# Patient Record
Sex: FEMALE | Race: WHITE | Employment: FULL TIME | URBAN - METROPOLITAN AREA
[De-identification: names, ages, dates, MRNs, and addresses within clinical notes are randomized per-mention and may not be internally consistent; named-entity substitution may affect disease eponyms.]

---

## 2023-02-03 NOTE — PATIENT INSTRUCTIONS
Spoke with patient and she was told to arrive at 0900  I told her she could arrive at 0930 at [facility] on 2/6/2023  for her  lumbar puncture at 1100. Patient will be npo after midnight and will call her  about taking her medications. Patient will arrive between 0900 and 0930.

## 2023-02-06 ENCOUNTER — HOSPITAL ENCOUNTER (OUTPATIENT)
Dept: GENERAL RADIOLOGY | Age: 28
Discharge: HOME | End: 2023-02-06
Payer: COMMERCIAL

## 2023-02-06 VITALS
BODY MASS INDEX: 20.09 KG/M2 | TEMPERATURE: 98.2 F | WEIGHT: 125 LBS | OXYGEN SATURATION: 100 % | HEART RATE: 84 BPM | SYSTOLIC BLOOD PRESSURE: 105 MMHG | DIASTOLIC BLOOD PRESSURE: 67 MMHG | RESPIRATION RATE: 16 BRPM | HEIGHT: 66 IN

## 2023-02-06 DIAGNOSIS — G35 MS (MULTIPLE SCLEROSIS) (HCC): ICD-10-CM

## 2023-02-06 LAB
GLUCOSE, CSF: 62 MG/DL (ref 40–75)
INR BLD: 0.91 INDEX
LYMPHS CSF: 0 %
LYMPHS CSF: 2 %
OTHER CELLS CSF: ABNORMAL CU MM
OTHER CELLS CSF: NORMAL CU MM
OTHER CELLS, CSF: 0 %
OTHER CELLS, CSF: 0 %
PROTEIN CSF: 28 MG/DL (ref 15–45)
PROTHROMBIN TIME: 11.7 SECONDS (ref 11.7–14.5)
RBC CSF: 0 CU MM
RBC CSF: 2 CU MM
SEGS, CSF: 0 %
SEGS, CSF: 0 %
TUBE #: ABNORMAL
TUBE #: NORMAL
WBC CSF: 0 CU MM (ref 0–5)
WBC CSF: 1 CU MM (ref 0–5)

## 2023-02-06 PROCEDURE — 89051 BODY FLUID CELL COUNT: CPT

## 2023-02-06 PROCEDURE — 6370000000 HC RX 637 (ALT 250 FOR IP): Performed by: RADIOLOGY

## 2023-02-06 PROCEDURE — 84157 ASSAY OF PROTEIN OTHER: CPT

## 2023-02-06 PROCEDURE — 62328 DX LMBR SPI PNXR W/FLUOR/CT: CPT

## 2023-02-06 PROCEDURE — 87327 CRYPTOCOCCUS NEOFORM AG IA: CPT

## 2023-02-06 PROCEDURE — 7100000011 HC PHASE II RECOVERY - ADDTL 15 MIN

## 2023-02-06 PROCEDURE — 87070 CULTURE OTHR SPECIMN AEROBIC: CPT

## 2023-02-06 PROCEDURE — 86592 SYPHILIS TEST NON-TREP QUAL: CPT

## 2023-02-06 PROCEDURE — 85610 PROTHROMBIN TIME: CPT

## 2023-02-06 PROCEDURE — 83916 OLIGOCLONAL BANDS: CPT

## 2023-02-06 PROCEDURE — 7100000010 HC PHASE II RECOVERY - FIRST 15 MIN

## 2023-02-06 PROCEDURE — 82945 GLUCOSE OTHER FLUID: CPT

## 2023-02-06 RX ORDER — PROPRANOLOL HYDROCHLORIDE 20 MG/1
20 TABLET ORAL 3 TIMES DAILY
COMMUNITY

## 2023-02-06 RX ORDER — FLUOXETINE HYDROCHLORIDE 20 MG/1
60 CAPSULE ORAL DAILY
COMMUNITY

## 2023-02-06 RX ORDER — HYDROXYZINE HYDROCHLORIDE 25 MG/1
25 TABLET, FILM COATED ORAL 3 TIMES DAILY PRN
COMMUNITY

## 2023-02-06 RX ORDER — ACETAMINOPHEN 325 MG/1
650 TABLET ORAL ONCE
Status: COMPLETED | OUTPATIENT
Start: 2023-02-06 | End: 2023-02-06

## 2023-02-06 RX ORDER — GABAPENTIN 600 MG/1
600 TABLET ORAL 3 TIMES DAILY
COMMUNITY

## 2023-02-06 RX ORDER — BUSPIRONE HYDROCHLORIDE 10 MG/1
30 TABLET ORAL 2 TIMES DAILY
COMMUNITY

## 2023-02-06 RX ORDER — NORETHINDRONE ACETATE AND ETHINYL ESTRADIOL 1MG-20(24)
KIT ORAL
COMMUNITY

## 2023-02-06 RX ADMIN — ACETAMINOPHEN 650 MG: 325 TABLET ORAL at 15:26

## 2023-02-06 ASSESSMENT — PAIN SCALES - GENERAL
PAINLEVEL_OUTOF10: 5

## 2023-02-06 ASSESSMENT — PAIN DESCRIPTION - ONSET
ONSET: ON-GOING

## 2023-02-06 ASSESSMENT — PAIN DESCRIPTION - PAIN TYPE
TYPE: ACUTE PAIN

## 2023-02-06 ASSESSMENT — PAIN DESCRIPTION - LOCATION
LOCATION: HEAD

## 2023-02-06 ASSESSMENT — PAIN DESCRIPTION - ORIENTATION
ORIENTATION: MID

## 2023-02-06 ASSESSMENT — PAIN DESCRIPTION - DESCRIPTORS
DESCRIPTORS: ACHING
DESCRIPTORS: THROBBING
DESCRIPTORS: ACHING
DESCRIPTORS: ACHING

## 2023-02-06 ASSESSMENT — PAIN DESCRIPTION - FREQUENCY
FREQUENCY: CONTINUOUS

## 2023-02-06 ASSESSMENT — PAIN - FUNCTIONAL ASSESSMENT: PAIN_FUNCTIONAL_ASSESSMENT: 0-10

## 2023-02-06 NOTE — DISCHARGE INSTRUCTIONS
Watch for signs of infection    Elevated temperature, redness or pain at site,   Drainage at incision

## 2023-02-06 NOTE — PROGRESS NOTES
1400 This nurse contacted radiology and spoke with Dr. De León Or for report on patient. 1404 Pt. Brought back to unit, pt. C/o having increased anxiety and expressed feeling of not receiving sedation prior to lumbar puncture. Pt. Remains NPO x1 hour and bedrest x2 hours. This nurse expresses understanding and got pt. Friend for support. 1406 Pt. Turned to left to check band-aid to back. Band-aid is C/D/I and pt. C/o headache 5/10. Pt. States she has had headache all day it has just gotten worse. 1430 Dr. De León Or notified of patients headache, states pt can have Tylenol if she wants it. 1435 Pt. Updated on Dr. Manju Siddiqi recommendation, pt. To take Tylenol from home. 1440 Red top and CBC collected per pre / post orders. Sent down to lab. Friend remains at bedside, no further needs at this time. 1526 Pt. Provided with Tylenol per doctors order. Pt. Denies further needs at this time. Bandaid to back to C/D/I. 1600 DC instructions reviewed with pt, pt. Expresses understanding. 1620 Pt. C/o pain 5/10 still with headache. Pt. Encouraged to drink fluids. This nurse had given patient 2 glasses of water. 1625 Pt. To DC home in private vehicle.

## 2023-02-07 LAB — CRYPTOC AG CSF QL IA.RAPID: NORMAL

## 2023-02-08 LAB — VDRL CSF QL: NON REACTIVE

## 2023-02-09 LAB
ALB CSF/SERPL: 4.6 RATIO (ref 0–9)
ALBUMIN CSF-MCNC: 20 MG/DL (ref 0–35)
ALBUMIN SERPL-MCNC: 4313 MG/DL (ref 3500–5200)
IGG CSF-MCNC: 1.9 MG/DL (ref 0–6)
IGG SERPL-MCNC: 774 MG/DL (ref 768–1632)
IGG SYNTH RATE SER+CSF CALC-MRATE: <0 MG/D
IGG/ALB CLEAR SER+CSF-RTO: 0.53 RATIO (ref 0.28–0.66)
IGG/ALB CSF: 0.1 RATIO (ref 0.09–0.25)
OLIGOCLONAL BANDS CSF ELPH-IMP: NEGATIVE
OLIGOCLONAL BANDS CSF ELPH-IMP: NORMAL
OLIGOCLONAL BANDS CSF IEF: 0 BANDS (ref 0–1)

## 2023-02-10 LAB
CULTURE: ABNORMAL
GRAM SMEAR: ABNORMAL
Lab: ABNORMAL
SPECIMEN: ABNORMAL

## 2023-06-26 LAB
VDRL CSF QL: NON REACTIVE
VDRL CSF-TITR: NON REACTIVE {TITER}